# Patient Record
Sex: MALE | Race: WHITE | NOT HISPANIC OR LATINO | Employment: OTHER | URBAN - METROPOLITAN AREA
[De-identification: names, ages, dates, MRNs, and addresses within clinical notes are randomized per-mention and may not be internally consistent; named-entity substitution may affect disease eponyms.]

---

## 2018-06-11 LAB
ALBUMIN SERPL BCP-MCNC: 4.7 G/DL (ref 3.5–5.7)
ALP SERPL-CCNC: 55 IU/L (ref 40–150)
ALT SERPL W P-5'-P-CCNC: 14 IU/L (ref 0–50)
ANION GAP SERPL CALCULATED.3IONS-SCNC: 12.1 MM/L
AST SERPL W P-5'-P-CCNC: 11 U/L (ref 8–27)
BASOPHILS # BLD AUTO: 0.1 X3/UL (ref 0–0.3)
BASOPHILS # BLD AUTO: 0.9 % (ref 0–2)
BILIRUB SERPL-MCNC: 0.4 MG/DL (ref 0.3–1)
BILIRUBIN DIRECT (HISTORICAL): 0 MG/DL (ref 0–0.2)
BUN SERPL-MCNC: 35 MG/DL (ref 7–25)
CALCIUM SERPL-MCNC: 9.5 MG/DL (ref 8.6–10.5)
CHLORIDE SERPL-SCNC: 103 MM/L (ref 98–107)
CHOLEST SERPL-MCNC: 187 MG/DL (ref 0–200)
CO2 SERPL-SCNC: 25 MM/L (ref 21–31)
CREAT SERPL-MCNC: 2.21 MG/DL (ref 0.7–1.3)
DEPRECATED RDW RBC AUTO: 13.4 % (ref 11.5–14.5)
EGFR (HISTORICAL): 31 GFR
EGFR AFRICAN AMERICAN (HISTORICAL): 38 GFR
EOSINOPHIL # BLD AUTO: 0.2 X3/UL (ref 0–0.5)
EOSINOPHIL NFR BLD AUTO: 2.9 % (ref 0–5)
EST. AVERAGE GLUCOSE BLD GHB EST-MCNC: 114 MG/DL
GLUCOSE (HISTORICAL): 116 MG/DL (ref 65–99)
HBA1C MFR BLD HPLC: 5.6 % (ref 4–6.2)
HCT VFR BLD AUTO: 41.4 % (ref 42–52)
HDLC SERPL-MCNC: 29 MG/DL (ref 40–60)
HGB BLD-MCNC: 14.4 G/DL (ref 14–18)
LDLC SERPL CALC-MCNC: 131.6 MG/DL (ref 75–193)
LYMPHOCYTES # BLD AUTO: 1.8 X3/UL (ref 1.2–4.2)
LYMPHOCYTES NFR BLD AUTO: 27.4 % (ref 20.5–51.1)
MCH RBC QN AUTO: 30.1 PG (ref 26–34)
MCHC RBC AUTO-ENTMCNC: 34.9 G/DL (ref 31–36)
MCV RBC AUTO: 86.3 FL (ref 81–99)
MONOCYTES # BLD AUTO: 0.8 X3/UL (ref 0–1)
MONOCYTES NFR BLD AUTO: 11.7 % (ref 1.7–12)
NEUTROPHILS # BLD AUTO: 3.8 X3/UL (ref 1.4–6.5)
NEUTS SEG NFR BLD AUTO: 57.1 % (ref 42.2–75.2)
OSMOLALITY, SERUM (HISTORICAL): 281 MOSM (ref 262–291)
PLATELET # BLD AUTO: 227 X3/UL (ref 130–400)
PMV BLD AUTO: 7.2 FL (ref 8.6–11.7)
POTASSIUM SERPL-SCNC: 4.1 MM/L (ref 3.5–5.5)
RBC # BLD AUTO: 4.79 X6/UL (ref 4.3–5.9)
SODIUM SERPL-SCNC: 136 MM/L (ref 134–143)
TOTAL PROTEIN (HISTORICAL): 7.4 G/DL (ref 6.4–8.9)
TRIGL SERPL-MCNC: 132 MG/DL (ref 44–166)
TSH SERPL DL<=0.05 MIU/L-ACNC: 1.2 UIU/M (ref 0.45–5.33)
VLDL CHOLESTEROL (HISTORICAL): 26 MG/DL (ref 5–51)
WBC # BLD AUTO: 6.7 X3/UL (ref 4.8–10.8)

## 2022-01-31 ENCOUNTER — OFFICE VISIT (OUTPATIENT)
Dept: FAMILY MEDICINE CLINIC | Facility: HOME HEALTHCARE | Age: 57
End: 2022-01-31
Payer: COMMERCIAL

## 2022-01-31 VITALS
HEART RATE: 95 BPM | HEIGHT: 72 IN | TEMPERATURE: 99.5 F | SYSTOLIC BLOOD PRESSURE: 140 MMHG | RESPIRATION RATE: 20 BRPM | WEIGHT: 214.8 LBS | BODY MASS INDEX: 29.09 KG/M2 | OXYGEN SATURATION: 98 % | DIASTOLIC BLOOD PRESSURE: 90 MMHG

## 2022-01-31 DIAGNOSIS — M21.612 BILATERAL BUNIONS: ICD-10-CM

## 2022-01-31 DIAGNOSIS — M13.0 POLYARTHRITIS OF MULTIPLE SITES: ICD-10-CM

## 2022-01-31 DIAGNOSIS — Z00.00 ENCOUNTER FOR MEDICAL EXAMINATION TO ESTABLISH CARE: Primary | ICD-10-CM

## 2022-01-31 DIAGNOSIS — F10.20 ALCOHOLISM (HCC): ICD-10-CM

## 2022-01-31 DIAGNOSIS — Z11.59 ENCOUNTER FOR HEPATITIS C SCREENING TEST FOR LOW RISK PATIENT: ICD-10-CM

## 2022-01-31 DIAGNOSIS — I10 PRIMARY HYPERTENSION: ICD-10-CM

## 2022-01-31 DIAGNOSIS — M19.049 HAND ARTHRITIS: ICD-10-CM

## 2022-01-31 DIAGNOSIS — Z13.29 SCREENING FOR ENDOCRINE, NUTRITIONAL, METABOLIC AND IMMUNITY DISORDER: ICD-10-CM

## 2022-01-31 DIAGNOSIS — F25.9 SCHIZOAFFECTIVE DISORDER, UNSPECIFIED TYPE (HCC): ICD-10-CM

## 2022-01-31 DIAGNOSIS — Z12.11 COLON CANCER SCREENING: ICD-10-CM

## 2022-01-31 DIAGNOSIS — Z53.20 HIV SCREENING DECLINED: ICD-10-CM

## 2022-01-31 DIAGNOSIS — M21.611 BILATERAL BUNIONS: ICD-10-CM

## 2022-01-31 DIAGNOSIS — M15.1 HEBERDEN'S NODES OF BOTH HANDS: ICD-10-CM

## 2022-01-31 DIAGNOSIS — M10.9 ACUTE GOUT OF LEFT FOOT, UNSPECIFIED CAUSE: ICD-10-CM

## 2022-01-31 DIAGNOSIS — M25.50 POLYARTHRALGIA: ICD-10-CM

## 2022-01-31 DIAGNOSIS — Z13.228 SCREENING FOR ENDOCRINE, NUTRITIONAL, METABOLIC AND IMMUNITY DISORDER: ICD-10-CM

## 2022-01-31 DIAGNOSIS — Z13.21 SCREENING FOR ENDOCRINE, NUTRITIONAL, METABOLIC AND IMMUNITY DISORDER: ICD-10-CM

## 2022-01-31 DIAGNOSIS — R79.89 ELEVATED SERUM CREATININE: ICD-10-CM

## 2022-01-31 DIAGNOSIS — Z13.0 SCREENING FOR ENDOCRINE, NUTRITIONAL, METABOLIC AND IMMUNITY DISORDER: ICD-10-CM

## 2022-01-31 PROCEDURE — T1015 CLINIC SERVICE: HCPCS | Performed by: FAMILY MEDICINE

## 2022-01-31 RX ORDER — TRAZODONE HYDROCHLORIDE 100 MG/1
200 TABLET ORAL
COMMUNITY
Start: 2021-11-11

## 2022-01-31 RX ORDER — CLONAZEPAM 0.5 MG/1
TABLET ORAL
COMMUNITY
Start: 2022-01-11

## 2022-01-31 RX ORDER — TRAZODONE HYDROCHLORIDE 150 MG/1
TABLET ORAL
COMMUNITY
Start: 2021-11-18

## 2022-01-31 RX ORDER — QUETIAPINE FUMARATE 300 MG/1
600 TABLET, FILM COATED ORAL
COMMUNITY
Start: 2022-01-12

## 2022-01-31 RX ORDER — PREDNISONE 10 MG/1
TABLET ORAL
Qty: 30 TABLET | Refills: 0 | Status: SHIPPED | OUTPATIENT
Start: 2022-01-31 | End: 2022-02-12

## 2022-01-31 NOTE — PROGRESS NOTES
[facility]       NAME: David Reddy is a 60 y o  male  : 1965    MRN: [id number]  DATE: 2022  TIME: 12:46 PM    Assessment and Plan   Diagnoses and all orders for this visit:    Encounter for medical examination to establish care  -     Ambulatory Referral to Rheumatology; Future    Colon cancer screening  -     Ambulatory Referral to Gastroenterology; Future    Encounter for hepatitis C screening test for low risk patient  -     Hepatitis C antibody; Future    HIV screening declined  -     HIV 1/2 ANTIGEN/ANTIBODY (4TH GENERATION) W REFLEX [facility]; Future    Screening for endocrine, nutritional, metabolic and immunity disorder  -     Comprehensive metabolic panel  -     Lipid Panel with Direct LDL reflex; Future  -     CBC and differential  -     TSH, 3rd generation with Free T4 reflex; Future    Elevated serum creatinine  -     Comprehensive metabolic panel    Polyarthralgia  -     Ambulatory Referral to Rheumatology; Future    Polyarthritis of multiple sites  -     Ambulatory Referral to Rheumatology; Future    Hand arthritis  -     Ambulatory Referral to Rheumatology; Future    Acute gout of left foot, unspecified cause  -     predniSONE 10 mg tablet; Take 4 tablets (40 mg total) by mouth daily for 3 days, THEN 3 tablets (30 mg total) daily for 3 days, THEN 2 tablets (20 mg total) daily for 3 days, THEN 1 tablet (10 mg total) daily for 3 days  Schizoaffective disorder, unspecified type ([facility] [address] )    Primary hypertension    Bilateral bunions  -     Ambulatory Referral to Podiatry; Future    Alcoholism ([facility] )    Heberden's nodes of both hands    Other orders  -     clonazePAM (KlonoPIN) 0 5 mg tablet; take 1 to 2 tablets by mouth daily if needed for anxiety  -     QUEtiapine (SEROquel) 300 mg tablet; Take 600 mg by mouth daily at bedtime 2 tabs at HS   -     traZODone (DESYREL) 100 mg tablet;  Take 200 mg by mouth daily at bedtime (Patient not taking: Reported on 2022 )  - traZODone (DESYREL) 150 mg tablet; take 1/2 to 1 tablet by mouth at bedtime if needed for insomnia      Patient will get his fasting lab work completed  Patient will follow up in 3-4 weeks or sooner if needed so we can review lab work and readdress his elevated blood pressure  Patient did have creatinine of 2 2 in 2018 with no repeat lab work  Will re-evaluate patient's blood pressure next visit and likely start medication once lab work completed  Rheumatology referral  Podiatry referral  We will continue to follow Psychiatry  Addressed his alcoholism and counseled patient on attempting to stop consume alcohol  Smoking cessation discussed and encouraged  Patient will continue to get his psychiatric medications from Psychiatry  Instructed to call me with any questions or concerns    Chief Complaint     Chief Complaint   Patient presents with    New Patient Visit    Gout Pain     feet    Arthritis         History of Present Illness       HPI   33-year-old male new patient here today for sick visit  Patient states has not seen a physician in many years  Patient states with having issues with gout and arthritis of his hands for many years  States does take occasional ibuprofen  Positive deformities of both hands/fingers  Positive Heberden's nodes  Patient with gout left foot with mild erythema and tenderness just proximal to the left great toe  Positive bilateral bunions  Patient states falls with Psychiatry on a monthly basis at [facility] pass  Reports diagnosis of depression and schizoaffective disorder  Denies any suicidal ideation or thoughts of hurting others  Review of Systems   Review of Systems    As per HPI  Denies fevers, chills, headaches, dizziness, chest pain, shortness breath, cough, abdominal pain, nausea, vomiting, diarrhea, constipation, lower extremity edema, calf pain, weight changes      All other systems negative    Current Medications       Current Outpatient Medications:   clonazePAM (KlonoPIN) 0 5 mg tablet, take 1 to 2 tablets by mouth daily if needed for anxiety, Disp: , Rfl:     QUEtiapine (SEROquel) 300 mg tablet, Take 600 mg by mouth daily at bedtime 2 tabs at HS , Disp: , Rfl:     traZODone (DESYREL) 150 mg tablet, take 1/2 to 1 tablet by mouth at bedtime if needed for insomnia, Disp: , Rfl:     predniSONE 10 mg tablet, Take 4 tablets (40 mg total) by mouth daily for 3 days, THEN 3 tablets (30 mg total) daily for 3 days, THEN 2 tablets (20 mg total) daily for 3 days, THEN 1 tablet (10 mg total) daily for 3 days  , Disp: 30 tablet, Rfl: 0    traZODone (DESYREL) 100 mg tablet, Take 200 mg by mouth daily at bedtime (Patient not taking: Reported on 1/31/2022 ), Disp: , Rfl:     Current Allergies     Allergies as of 01/31/2022    (No Known Allergies)            The following portions of the patient's history were reviewed and updated as appropriate: allergies, current medications, past family history, past medical history, past social history, past surgical history and problem list      Past Medical History:   Diagnosis Date    Depression        History reviewed  No pertinent surgical history  History reviewed  No pertinent family history  Medications have been verified  Objective   /90   Pulse 95   Temp 99 5 °F (37 5 °C)   Resp 20   Ht 6' (1 829 m)   Wt 97 4 kg (214 lb 12 8 oz)   SpO2 98%   BMI 29 13 kg/m²        Physical Exam     Physical Exam  Vitals reviewed  Constitutional:       General: He is not in acute distress  Appearance: He is not ill-appearing, toxic-appearing or diaphoretic  HENT:      Head: Normocephalic  Nose: Nose normal       Mouth/Throat:      Mouth: Mucous membranes are moist       Pharynx: Oropharynx is clear  Eyes:      General: No scleral icterus  Conjunctiva/sclera: Conjunctivae normal       Pupils: Pupils are equal, round, and reactive to light     Cardiovascular:      Rate and Rhythm: Normal rate and regular rhythm  Heart sounds: Normal heart sounds  Pulmonary:      Effort: Pulmonary effort is normal       Breath sounds: Normal breath sounds  Abdominal:      General: Bowel sounds are normal       Palpations: Abdomen is soft  Tenderness: There is no abdominal tenderness  Musculoskeletal:      Cervical back: Neck supple  Right lower leg: No edema  Left lower leg: No edema  Comments: Positive Heberden's nodes both hands with finger deformities  Left foot gout described in HPI  Lymphadenopathy:      Cervical: No cervical adenopathy  Skin:     General: Skin is warm and dry  Capillary Refill: Capillary refill takes less than 2 seconds  Neurological:      Mental Status: He is alert and oriented to person, place, and time     Psychiatric:         Mood and Affect: Mood normal

## 2022-02-22 DIAGNOSIS — Z23 NEED FOR SHINGLES VACCINE: Primary | ICD-10-CM

## 2022-02-22 RX ORDER — ZOSTER VACCINE RECOMBINANT, ADJUVANTED 50 MCG/0.5
0.5 KIT INTRAMUSCULAR ONCE
Qty: 1 EACH | Refills: 1 | Status: SHIPPED | OUTPATIENT
Start: 2022-02-22 | End: 2022-02-22

## 2022-02-25 ENCOUNTER — TELEPHONE (OUTPATIENT)
Dept: OTHER | Facility: OTHER | Age: 57
End: 2022-02-25

## 2022-07-18 ENCOUNTER — OFFICE VISIT (OUTPATIENT)
Dept: RHEUMATOLOGY | Facility: CLINIC | Age: 57
End: 2022-07-18
Payer: COMMERCIAL

## 2022-07-18 ENCOUNTER — APPOINTMENT (OUTPATIENT)
Dept: LAB | Facility: MEDICAL CENTER | Age: 57
End: 2022-07-18
Payer: COMMERCIAL

## 2022-07-18 VITALS
DIASTOLIC BLOOD PRESSURE: 95 MMHG | SYSTOLIC BLOOD PRESSURE: 135 MMHG | BODY MASS INDEX: 32.51 KG/M2 | HEIGHT: 72 IN | WEIGHT: 240 LBS

## 2022-07-18 DIAGNOSIS — M13.0 POLYARTHRITIS OF MULTIPLE SITES: ICD-10-CM

## 2022-07-18 DIAGNOSIS — Z79.899 HIGH RISK MEDICATION USE: ICD-10-CM

## 2022-07-18 DIAGNOSIS — M19.049 HAND ARTHRITIS: ICD-10-CM

## 2022-07-18 DIAGNOSIS — M1A.9XX1 TOPHACEOUS GOUT: Primary | ICD-10-CM

## 2022-07-18 DIAGNOSIS — Z00.00 ENCOUNTER FOR MEDICAL EXAMINATION TO ESTABLISH CARE: ICD-10-CM

## 2022-07-18 DIAGNOSIS — M25.50 POLYARTHRALGIA: ICD-10-CM

## 2022-07-18 LAB
ALBUMIN SERPL BCP-MCNC: 4.4 G/DL (ref 3.5–5)
ALP SERPL-CCNC: 81 U/L (ref 46–116)
ALT SERPL W P-5'-P-CCNC: 20 U/L (ref 12–78)
ANION GAP SERPL CALCULATED.3IONS-SCNC: 9 MMOL/L (ref 4–13)
AST SERPL W P-5'-P-CCNC: 16 U/L (ref 5–45)
BASOPHILS # BLD AUTO: 0.06 THOUSANDS/ΜL (ref 0–0.1)
BASOPHILS NFR BLD AUTO: 1 % (ref 0–1)
BILIRUB SERPL-MCNC: 0.58 MG/DL (ref 0.2–1)
BUN SERPL-MCNC: 21 MG/DL (ref 5–25)
CALCIUM SERPL-MCNC: 9.7 MG/DL (ref 8.3–10.1)
CHLORIDE SERPL-SCNC: 106 MMOL/L (ref 96–108)
CO2 SERPL-SCNC: 24 MMOL/L (ref 21–32)
CREAT SERPL-MCNC: 1.92 MG/DL (ref 0.6–1.3)
CRP SERPL QL: 18.6 MG/L
EOSINOPHIL # BLD AUTO: 0.09 THOUSAND/ΜL (ref 0–0.61)
EOSINOPHIL NFR BLD AUTO: 1 % (ref 0–6)
ERYTHROCYTE [DISTWIDTH] IN BLOOD BY AUTOMATED COUNT: 16.2 % (ref 11.6–15.1)
ERYTHROCYTE [SEDIMENTATION RATE] IN BLOOD: 37 MM/HOUR (ref 0–19)
GFR SERPL CREATININE-BSD FRML MDRD: 37 ML/MIN/1.73SQ M
GLUCOSE SERPL-MCNC: 86 MG/DL (ref 65–140)
HCT VFR BLD AUTO: 44.9 % (ref 36.5–49.3)
HGB BLD-MCNC: 13.6 G/DL (ref 12–17)
IMM GRANULOCYTES # BLD AUTO: 0.05 THOUSAND/UL (ref 0–0.2)
IMM GRANULOCYTES NFR BLD AUTO: 1 % (ref 0–2)
LYMPHOCYTES # BLD AUTO: 1.45 THOUSANDS/ΜL (ref 0.6–4.47)
LYMPHOCYTES NFR BLD AUTO: 15 % (ref 14–44)
MCH RBC QN AUTO: 24.4 PG (ref 26.8–34.3)
MCHC RBC AUTO-ENTMCNC: 30.3 G/DL (ref 31.4–37.4)
MCV RBC AUTO: 81 FL (ref 82–98)
MONOCYTES # BLD AUTO: 0.75 THOUSAND/ΜL (ref 0.17–1.22)
MONOCYTES NFR BLD AUTO: 8 % (ref 4–12)
NEUTROPHILS # BLD AUTO: 7.41 THOUSANDS/ΜL (ref 1.85–7.62)
NEUTS SEG NFR BLD AUTO: 74 % (ref 43–75)
NRBC BLD AUTO-RTO: 0 /100 WBCS
PLATELET # BLD AUTO: 336 THOUSANDS/UL (ref 149–390)
PMV BLD AUTO: 8.9 FL (ref 8.9–12.7)
POTASSIUM SERPL-SCNC: 4 MMOL/L (ref 3.5–5.3)
PROT SERPL-MCNC: 8.3 G/DL (ref 6.4–8.4)
RBC # BLD AUTO: 5.57 MILLION/UL (ref 3.88–5.62)
SODIUM SERPL-SCNC: 139 MMOL/L (ref 135–147)
URATE SERPL-MCNC: 10.9 MG/DL (ref 3.5–8.5)
WBC # BLD AUTO: 9.81 THOUSAND/UL (ref 4.31–10.16)

## 2022-07-18 PROCEDURE — 86803 HEPATITIS C AB TEST: CPT | Performed by: INTERNAL MEDICINE

## 2022-07-18 PROCEDURE — 87340 HEPATITIS B SURFACE AG IA: CPT | Performed by: INTERNAL MEDICINE

## 2022-07-18 PROCEDURE — 36415 COLL VENOUS BLD VENIPUNCTURE: CPT | Performed by: INTERNAL MEDICINE

## 2022-07-18 PROCEDURE — 80053 COMPREHEN METABOLIC PANEL: CPT | Performed by: INTERNAL MEDICINE

## 2022-07-18 PROCEDURE — 84550 ASSAY OF BLOOD/URIC ACID: CPT | Performed by: INTERNAL MEDICINE

## 2022-07-18 PROCEDURE — 85652 RBC SED RATE AUTOMATED: CPT | Performed by: INTERNAL MEDICINE

## 2022-07-18 PROCEDURE — 86140 C-REACTIVE PROTEIN: CPT | Performed by: INTERNAL MEDICINE

## 2022-07-18 PROCEDURE — 86705 HEP B CORE ANTIBODY IGM: CPT | Performed by: INTERNAL MEDICINE

## 2022-07-18 PROCEDURE — 85025 COMPLETE CBC W/AUTO DIFF WBC: CPT | Performed by: INTERNAL MEDICINE

## 2022-07-18 PROCEDURE — 86480 TB TEST CELL IMMUN MEASURE: CPT | Performed by: INTERNAL MEDICINE

## 2022-07-18 PROCEDURE — 86704 HEP B CORE ANTIBODY TOTAL: CPT | Performed by: INTERNAL MEDICINE

## 2022-07-18 PROCEDURE — 99244 OFF/OP CNSLTJ NEW/EST MOD 40: CPT | Performed by: INTERNAL MEDICINE

## 2022-07-18 RX ORDER — FOLIC ACID 1 MG/1
1 TABLET ORAL DAILY
Qty: 30 TABLET | Refills: 5 | Status: SHIPPED | OUTPATIENT
Start: 2022-07-18

## 2022-07-18 NOTE — PROGRESS NOTES
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  Would benefit from Krystexxa infusions, which patient wants to obtain at the the [facility]  Will obtain prior auth  Patient has CKD, so will hold off initiating colchicine; urate lowering therapy such as allopurinol would be contraindicated to be used concurrently with Krystexxa (pegloticase)  Do labs  Start methotrexate 4 tabs once a week  Start folic acid daily  Will work on getting patient started Krystexxa infusions every 2 weeks at the [facility]  Follow gout diet  Can try diclofenac gel as needed for joint pain    Return to clinic in 4 months    Plan:  Diagnoses and all orders for this visit:    Tophaceous gout  -     CBC and differential  -     Sedimentation rate, automated  -     Comprehensive metabolic panel  -     C-reactive protein  -     Uric acid  -     Diclofenac Sodium (VOLTAREN) 1 %; Apply 2 g topically 4 (four) times a day    Polyarthralgia  -     Ambulatory Referral to Rheumatology    Polyarthritis of multiple sites  -     Ambulatory Referral to Rheumatology  -     Diclofenac Sodium (VOLTAREN) 1 %; Apply 2 g topically 4 (four) times a day    Hand arthritis  -     Ambulatory Referral to Rheumatology    Encounter for medical examination to establish care  -     Ambulatory Referral to Rheumatology    High risk medication use  -     Chronic Hepatitis Panel  -     Quantiferon TB Gold Plus  -     methotrexate 2 5 mg tablet; 4 tablet po weekly (take all 4 tablets on the same day every week)  -     folic acid (FOLVITE) 1 mg tablet;  Take 1 tablet (1 mg total) by mouth daily    Other orders  -     sodium chloride 0 9 % infusion  -     acetaminophen (TYLENOL) tablet 650 mg  -     diphenhydrAMINE (BENADRYL) tablet 25 mg  -     methylPREDNISolone sodium succinate (Solu-MEDROL) injection 125 mg  -     pegloticase (KRYSTEXXA) 8 mg in sodium chloride 0 9 % 250 mL IVPB  High risk medication use - Patient counseled on the risks and benefits of Krystexxa (pegloticase) infusions  This medication can significantly reduce tophi burden, however, has the potential for causing hypersensitivity infusion reactions and needs to be closely monitored during infusion  Also, antibody production to the medication has been shown to develop in patients who are not concurrently on DMARD therapy prophylaxis to prevent antibody production; DMARD therapy also reduces chance of hypersensitivity reaction to the medication  Patient will be started on methotrexate, which he'll be on for two weeks prior to starting infusions and will continue taking throughout her therapy on Krystexxa  His serum uric acid level will be closely monitored  Follow-up plan: RTC in 4 months        HPI  Homa Robins is a 60 y o   male who presents as a Rheumatology consult referred by Hi Simmons PA-C for evaluation of tophaceous gout  Has schizoaffective disorder  Had 1st gout attack and both feet in 2006  Started drinking heavily in May 2021, noticed bumps for there first time them; started noticing hand pain in September 2021  Stopped drinking in 12/2021  Smokes 1/2 PPD of cigarettes  Review of Systems  Review of Systems   Constitutional: Negative for fatigue, fever and unexpected weight change  HENT: Negative for mouth sores  Respiratory: Negative for cough and shortness of breath  Cardiovascular: Negative for chest pain and leg swelling  Gastrointestinal: Negative for abdominal pain, constipation and diarrhea  Musculoskeletal: Positive for arthralgias and joint swelling  Negative for back pain and myalgias  Skin: Negative for color change and rash  Neurological: Negative for weakness  Hematological: Negative for adenopathy  Psychiatric/Behavioral: Negative for sleep disturbance  Reviewed and agree      Allergies  No Known Allergies    Home Medications    Current Outpatient Medications:     clonazePAM (KlonoPIN) 0 5 mg tablet, take 1 to 2 tablets by mouth daily if needed for anxiety, Disp: , Rfl:     Diclofenac Sodium (VOLTAREN) 1 %, Apply 2 g topically 4 (four) times a day, Disp: 100 g, Rfl: 6    folic acid (FOLVITE) 1 mg tablet, Take 1 tablet (1 mg total) by mouth daily, Disp: 30 tablet, Rfl: 5    methotrexate 2 5 mg tablet, 4 tablet po weekly (take all 4 tablets on the same day every week), Disp: 20 tablet, Rfl: 5    QUEtiapine (SEROquel) 300 mg tablet, Take 600 mg by mouth daily at bedtime 2 tabs at HS , Disp: , Rfl:     traZODone (DESYREL) 150 mg tablet, take 1/2 to 1 tablet by mouth at bedtime if needed for insomnia, Disp: , Rfl:     traZODone (DESYREL) 100 mg tablet, Take 200 mg by mouth daily at bedtime (Patient not taking: No sig reported), Disp: , Rfl:     Past Medical History  Past Medical History:   Diagnosis Date    Depression        Past Surgical History   History reviewed  No pertinent surgical history  Family History  History reviewed  No pertinent family history  Brother - gout  Sister - osteoarthritis      Social History  Occupation: was a , then used to [employer]; stopped working in [employer] History     Substance and Sexual Activity   Alcohol Use Not Currently    Alcohol/week: 12 0 standard drinks    Types: 12 Cans of beer per week    Comment: Last drink 12/2021     Social History     Substance and Sexual Activity   Drug Use Not Currently     Social History     Tobacco Use   Smoking Status Current Every Day Smoker    Packs/day: 0 50    Types: Cigarettes   Smokeless Tobacco Never Used       Objective:  Vitals:    07/18/22 1346   BP: 135/95   Weight: 109 kg (240 lb)   Height: 6' (1 829 m)       Physical Exam  Constitutional:       General: He is not in acute distress  HENT:      Head: Normocephalic and atraumatic     Eyes:      Conjunctiva/sclera: Conjunctivae normal    Cardiovascular:      Rate and Rhythm: Normal rate and regular rhythm  Heart sounds: S1 normal and S2 normal      No friction rub  Pulmonary:      Effort: Pulmonary effort is normal  No respiratory distress  Breath sounds: Normal breath sounds  No wheezing, rhonchi or rales  Musculoskeletal:         General: Swelling, tenderness and deformity present  Cervical back: Neck supple  Comments: Significant hand tophi (picture uploaded in Media); L big toe prominence   Skin:     General: Skin is warm and dry  Coloration: Skin is not pale  Findings: No rash  Neurological:      Mental Status: He is alert  Mental status is at baseline  Psychiatric:         Mood and Affect: Mood normal          Behavior: Behavior normal        Reviewed labs      Labs:   Office Visit on 07/18/2022   Component Date Value Ref Range Status    WBC 07/18/2022 9 81  4 31 - 10 16 Thousand/uL Final    RBC 07/18/2022 5 57  3 88 - 5 62 Million/uL Final    Hemoglobin 07/18/2022 13 6  12 0 - 17 0 g/dL Final    Hematocrit 07/18/2022 44 9  36 5 - 49 3 % Final    MCV 07/18/2022 81 (A) 82 - 98 fL Final    MCH 07/18/2022 24 4 (A) 26 8 - 34 3 pg Final    MCHC 07/18/2022 30 3 (A) 31 4 - 37 4 g/dL Final    RDW 07/18/2022 16 2 (A) 11 6 - 15 1 % Final    MPV 07/18/2022 8 9  8 9 - 12 7 fL Final    Platelets 55/49/0616 336  149 - 390 Thousands/uL Final    nRBC 07/18/2022 0  /100 WBCs Final    Neutrophils Relative 07/18/2022 74  43 - 75 % Final    Immat GRANS % 07/18/2022 1  0 - 2 % Final    Lymphocytes Relative 07/18/2022 15  14 - 44 % Final    Monocytes Relative 07/18/2022 8  4 - 12 % Final    Eosinophils Relative 07/18/2022 1  0 - 6 % Final    Basophils Relative 07/18/2022 1  0 - 1 % Final    Neutrophils Absolute 07/18/2022 7 41  1 85 - 7 62 Thousands/µL Final    Immature Grans Absolute 07/18/2022 0 05  0 00 - 0 20 Thousand/uL Final    Lymphocytes Absolute 07/18/2022 1 45  0 60 - 4 47 Thousands/µL Final    Monocytes Absolute 07/18/2022 0  75  0 17 - 1 22 Thousand/µL Final    Eosinophils Absolute 07/18/2022 0 09  0 00 - 0 61 Thousand/µL Final    Basophils Absolute 07/18/2022 0 06  0 00 - 0 10 Thousands/µL Final    Sed Rate 07/18/2022 37 (A) 0 - 19 mm/hour Final    Sodium 07/18/2022 139  135 - 147 mmol/L Final    Potassium 07/18/2022 4 0  3 5 - 5 3 mmol/L Final    Chloride 07/18/2022 106  96 - 108 mmol/L Final    CO2 07/18/2022 24  21 - 32 mmol/L Final    ANION GAP 07/18/2022 9  4 - 13 mmol/L Final    BUN 07/18/2022 21  5 - 25 mg/dL Final    Creatinine 07/18/2022 1 92 (A) 0 60 - 1 30 mg/dL Final    Standardized to IDMS reference method    Glucose 07/18/2022 86  65 - 140 mg/dL Final    If the patient is fasting, the ADA then defines impaired fasting glucose as > 100 mg/dL and diabetes as > or equal to 123 mg/dL  Specimen collection should occur prior to Sulfasalazine administration due to the potential for falsely depressed results  Specimen collection should occur prior to Sulfapyridine administration due to the potential for falsely elevated results   Calcium 07/18/2022 9 7  8 3 - 10 1 mg/dL Final    AST 07/18/2022 16  5 - 45 U/L Final    Specimen collection should occur prior to Sulfasalazine administration due to the potential for falsely depressed results   ALT 07/18/2022 20  12 - 78 U/L Final    Specimen collection should occur prior to Sulfasalazine and/or Sulfapyridine administration due to the potential for falsely depressed results   Alkaline Phosphatase 07/18/2022 81  46 - 116 U/L Final    Total Protein 07/18/2022 8 3  6 4 - 8 4 g/dL Final    Albumin 07/18/2022 4 4  3 5 - 5 0 g/dL Final    Total Bilirubin 07/18/2022 0 58  0 20 - 1 00 mg/dL Final    Use of this assay is not recommended for patients undergoing treatment with eltrombopag due to the potential for falsely elevated results      eGFR 07/18/2022 37  ml/min/1 73sq m Final    CRP 07/18/2022 18 6 (A) <3 0 mg/L Final    Uric Acid 07/18/2022 10 9 (A) 3 5 - 8 5 mg/dL Final    Specimen collection should occur prior to Metamizole administration due to the potential for falsely depressed results   Hepatitis B Surface Ag 07/18/2022 Non-reactive  Non-reactive, NonReactive - Confirmed Final    Hepatitis C Ab 07/18/2022 Non-reactive  Non-reactive Final    Hep B C IgM 07/18/2022 Non-reactive  Non-reactive Final    Hep B Core Total Ab 07/18/2022 Non-reactive  Non-reactive Final    QFT Nil 07/18/2022 0 02  0 - 8 0 IU/ml Final    QFT TB1-NIL 07/18/2022 0 00  IU/ml Final    QFT TB2-NIL 07/18/2022 0 00  IU/ml Final    QFT Mitogen-NIL 07/18/2022 >10 00  IU/ml Final    QFT Final Interpretation 07/18/2022 Negative  Negative Final    No Interferon-gamma response to M  tuberculosis antigens detected  Infection with M  tuberculosis is unlikely  A single negative result does not exclude infection with M  tuberculosis  In patients at high risk for M  tuberculosis infection, a second test should be considered in accordance with the 2017 ATS/IDSA/CDC Clinical Practice Guidelines for Diagnosis of Tuberculosis in Adults and Children  False negative results can be a result of incorrect blood sample collection or handling of the specimen affecting lymphocyte function

## 2022-07-19 LAB
HBV CORE AB SER QL: NORMAL
HBV CORE IGM SER QL: NORMAL
HBV SURFACE AG SER QL: NORMAL
HCV AB SER QL: NORMAL

## 2022-07-20 LAB
GAMMA INTERFERON BACKGROUND BLD IA-ACNC: 0.02 IU/ML
M TB IFN-G BLD-IMP: NEGATIVE
M TB IFN-G CD4+ BCKGRND COR BLD-ACNC: 0 IU/ML
M TB IFN-G CD4+ BCKGRND COR BLD-ACNC: 0 IU/ML
MITOGEN IGNF BCKGRD COR BLD-ACNC: >10 IU/ML

## 2022-08-15 ENCOUNTER — TELEPHONE (OUTPATIENT)
Dept: RHEUMATOLOGY | Facility: CLINIC | Age: 57
End: 2022-08-15

## 2022-08-15 PROBLEM — M1A.9XX1 TOPHACEOUS GOUT: Status: ACTIVE | Noted: 2022-08-15

## 2022-08-15 RX ORDER — ACETAMINOPHEN 325 MG/1
650 TABLET ORAL ONCE
OUTPATIENT
Start: 2022-08-15

## 2022-08-15 RX ORDER — SODIUM CHLORIDE 9 MG/ML
20 INJECTION, SOLUTION INTRAVENOUS ONCE
OUTPATIENT
Start: 2022-08-15

## 2022-08-15 RX ORDER — DIPHENHYDRAMINE HCL 25 MG
25 TABLET ORAL ONCE
OUTPATIENT
Start: 2022-08-15

## 2022-08-15 RX ORDER — METHYLPREDNISOLONE SODIUM SUCCINATE 40 MG/ML
125 INJECTION, POWDER, LYOPHILIZED, FOR SOLUTION INTRAMUSCULAR; INTRAVENOUS ONCE
OUTPATIENT
Start: 2022-08-15

## 2022-08-15 NOTE — TELEPHONE ENCOUNTER
Meliza Milton,  Please work on prior [employer] for patient to start receiving Krystexxa (pegloticase) infusions 8mg IV every 2 weeks at the [facility] center for severe tophaceous gout (for buy and bill through his medical benefits)  He's already been on more than 2 weeks of methotrexate and is not on any anti-hyperuricemic drugs  Has recent TB quantiferon and viral hepatitis panel  He already signed a patient assistance form if needed that needs to be faxed in  Please keep me updated regarding this prior auth  Gala Ricardo, please help if needed

## 2022-08-16 NOTE — TELEPHONE ENCOUNTER
Lul Messer from [employer] called and did not want to leave a message  States that she will send an email to provider

## 2022-09-09 NOTE — TELEPHONE ENCOUNTER
Patient called in regard to the Krystexxa infusions stating that he still hasn't heard from anyone at this point  He states that the hospital was suppose to call him but he hasn't heard back from anyone  Also states that he hasn't heard back from his insurance company either  Patient asking for advisement as to how he should proceed    Patient asked to speak from clinical team

## 2022-09-09 NOTE — TELEPHONE ENCOUNTER
Spoke with Isaias Patel from [employer], refaxed a corrected patient enrollment form to       Prior auth submitted on [employer] key [id number]

## 2022-09-09 NOTE — TELEPHONE ENCOUNTER
Please follow-up on what's going on regarding this patient's [employer] Client?? In addition to sending the form, you were supposed to do a prior auth

## 2022-09-10 NOTE — TELEPHONE ENCOUNTER
[facility] called and denied patient to have medicatio: Gill Longoria  We are having phone problems and then I lost the call

## 2022-09-13 NOTE — TELEPHONE ENCOUNTER
Yo Thorpe from [facility] is calling stating that she would like to speak to Spencer Cooper  Please call Yo Thorpe back at    #[phone]

## 2022-09-19 NOTE — TELEPHONE ENCOUNTER
Patient received letter that his medication was denied       He would like a call  Back to discuss options     #: [phone]

## 2022-09-19 NOTE — TELEPHONE ENCOUNTER
Will work on [address]  [facility] with information update that allopurinol or any other oral gout medication would be contraindicated in patient's case due to CKD

## 2022-09-30 NOTE — TELEPHONE ENCOUNTER
Spoke with [facility] on 9/30, their clinical team is reviewing information for patient and we should receive a fax with determination today, latest on Monday  Determination will be faxed to [phone]

## 2022-11-16 ENCOUNTER — OFFICE VISIT (OUTPATIENT)
Dept: FAMILY MEDICINE CLINIC | Facility: HOME HEALTHCARE | Age: 57
End: 2022-11-16

## 2022-11-16 VITALS
SYSTOLIC BLOOD PRESSURE: 135 MMHG | BODY MASS INDEX: 29.53 KG/M2 | OXYGEN SATURATION: 99 % | DIASTOLIC BLOOD PRESSURE: 82 MMHG | WEIGHT: 218 LBS | HEART RATE: 78 BPM | HEIGHT: 72 IN | RESPIRATION RATE: 18 BRPM | TEMPERATURE: 99.3 F

## 2022-11-16 DIAGNOSIS — F25.9 SCHIZOAFFECTIVE DISORDER, UNSPECIFIED TYPE (HCC): ICD-10-CM

## 2022-11-16 DIAGNOSIS — Z13.228 SCREENING FOR ENDOCRINE, NUTRITIONAL, METABOLIC AND IMMUNITY DISORDER: ICD-10-CM

## 2022-11-16 DIAGNOSIS — M25.50 POLYARTHRALGIA: ICD-10-CM

## 2022-11-16 DIAGNOSIS — M1A.9XX1 TOPHACEOUS GOUT: ICD-10-CM

## 2022-11-16 DIAGNOSIS — N18.32 STAGE 3B CHRONIC KIDNEY DISEASE (HCC): Primary | ICD-10-CM

## 2022-11-16 DIAGNOSIS — Z13.29 SCREENING FOR ENDOCRINE, NUTRITIONAL, METABOLIC AND IMMUNITY DISORDER: ICD-10-CM

## 2022-11-16 DIAGNOSIS — Z13.0 SCREENING FOR ENDOCRINE, NUTRITIONAL, METABOLIC AND IMMUNITY DISORDER: ICD-10-CM

## 2022-11-16 DIAGNOSIS — Z13.21 SCREENING FOR ENDOCRINE, NUTRITIONAL, METABOLIC AND IMMUNITY DISORDER: ICD-10-CM

## 2022-11-16 NOTE — PROGRESS NOTES
[facility]       NAME: Flo Conde is a 59 y o  male  : 1965    MRN: [id number]  DATE: 2022  TIME: 2:03 PM    Assessment and Plan   Diagnoses and all orders for this visit:    Stage 3b chronic kidney disease ([facility] )  -     Comprehensive metabolic panel  -     Ambulatory Referral to Nephrology; Future    Screening for endocrine, nutritional, metabolic and immunity disorder  -     TSH, 3rd generation with Free T4 reflex; Future  -     Comprehensive metabolic panel  -     Lipid Panel with Direct LDL reflex; Future  -     CBC and differential    Polyarthralgia    Schizoaffective disorder, unspecified type ([facility] )    Tophaceous gout        Patient will make follow-up appointment with rheumatology to discuss medication options  Consult placed for Nephrology due to chronic kidney disease  Patient has lab work pending which he was supposed to have completed back in January labs reordered and will call patient with results and schedule follow-up appointment after reviewing labs  Patient however is due for annual visit and should command and 1 month  Instructed to call with any questions or concerns    Chief Complaint     Chief Complaint   Patient presents with   • Follow-up         History of Present Illness       HPI   78-year-old male here today for routine follow-up visit  Patient had lab work pending from January which she had not had completed  Patient was evaluated by Rheumatology for arthritis and gout  Was started on methotrexate and did recommend infusion therapy patient is resistant to the infusion therapy however is supposed to follow-up per Rheumatology in November and discuss his options  No other complaints at this time  Patient states has stopped / abstained from alcohol for past several months  Review of Systems   Review of Systems   Constitutional: Negative  Respiratory: Negative  Cardiovascular: Negative  Gastrointestinal: Negative  Musculoskeletal: Positive for arthralgias  Neurological: Negative  Psychiatric/Behavioral: Negative for suicidal ideas  All other systems reviewed and are negative  Current Medications       Current Outpatient Medications:   •  clonazePAM (KlonoPIN) 0 5 mg tablet, take 1 to 2 tablets by mouth daily if needed for anxiety, Disp: , Rfl:   •  folic acid (FOLVITE) 1 mg tablet, Take 1 tablet (1 mg total) by mouth daily, Disp: 30 tablet, Rfl: 5  •  methotrexate 2 5 mg tablet, 4 tablet po weekly (take all 4 tablets on the same day every week), Disp: 20 tablet, Rfl: 5  •  QUEtiapine (SEROquel) 300 mg tablet, Take 600 mg by mouth daily at bedtime 2 tabs at HS , Disp: , Rfl:   •  traZODone (DESYREL) 150 mg tablet, take 1/2 to 1 tablet by mouth at bedtime if needed for insomnia, Disp: , Rfl:   •  Diclofenac Sodium (VOLTAREN) 1 %, Apply 2 g topically 4 (four) times a day (Patient not taking: Reported on 11/16/2022), Disp: 100 g, Rfl: 6  •  traZODone (DESYREL) 100 mg tablet, Take 200 mg by mouth daily at bedtime (Patient not taking: Reported on 1/31/2022), Disp: , Rfl:     Current Allergies     Allergies as of 11/16/2022   • (No Known Allergies)            The following portions of the patient's history were reviewed and updated as appropriate: allergies, current medications, past family history, past medical history, past social history, past surgical history and problem list      Past Medical History:   Diagnosis Date   • Depression        History reviewed  No pertinent surgical history  History reviewed  No pertinent family history  Medications have been verified  Objective   /82 (BP Location: Left arm, Patient Position: Sitting, Cuff Size: Adult)   Pulse 78   Temp 99 3 °F (37 4 °C) (Temporal)   Resp 18   Ht 6' (1 829 m)   Wt 98 9 kg (218 lb)   SpO2 99%   BMI 29 57 kg/m²        Physical Exam     Physical Exam  Vitals and nursing note reviewed     Constitutional:       General: He is not in acute distress  Appearance: He is not ill-appearing, toxic-appearing or diaphoretic  HENT:      Head: Normocephalic  Cardiovascular:      Rate and Rhythm: Normal rate and regular rhythm  Pulmonary:      Effort: Pulmonary effort is normal       Breath sounds: Normal breath sounds  Abdominal:      General: Bowel sounds are normal       Palpations: Abdomen is soft  Tenderness: There is no abdominal tenderness  Musculoskeletal:      Right lower leg: No edema  Left lower leg: No edema  Neurological:      Mental Status: He is alert and oriented to person, place, and time     Psychiatric:         Mood and Affect: Mood normal

## 2022-11-18 ENCOUNTER — APPOINTMENT (OUTPATIENT)
Dept: LAB | Facility: HOSPITAL | Age: 57
End: 2022-11-18
Attending: INTERNAL MEDICINE

## 2022-11-18 DIAGNOSIS — Z13.0 SCREENING FOR ENDOCRINE, NUTRITIONAL, METABOLIC AND IMMUNITY DISORDER: ICD-10-CM

## 2022-11-18 DIAGNOSIS — Z13.21 SCREENING FOR ENDOCRINE, NUTRITIONAL, METABOLIC AND IMMUNITY DISORDER: ICD-10-CM

## 2022-11-18 DIAGNOSIS — M1A.9XX1 TOPHACEOUS GOUT: ICD-10-CM

## 2022-11-18 DIAGNOSIS — Z13.29 SCREENING FOR ENDOCRINE, NUTRITIONAL, METABOLIC AND IMMUNITY DISORDER: ICD-10-CM

## 2022-11-18 DIAGNOSIS — Z13.228 SCREENING FOR ENDOCRINE, NUTRITIONAL, METABOLIC AND IMMUNITY DISORDER: ICD-10-CM

## 2022-11-18 LAB
ALBUMIN SERPL BCP-MCNC: 4.1 G/DL (ref 3.5–5)
ALP SERPL-CCNC: 92 U/L (ref 46–116)
ALT SERPL W P-5'-P-CCNC: 38 U/L (ref 12–78)
ANION GAP SERPL CALCULATED.3IONS-SCNC: 6 MMOL/L (ref 4–13)
AST SERPL W P-5'-P-CCNC: 18 U/L (ref 5–45)
BASOPHILS # BLD AUTO: 0.05 THOUSANDS/ÂΜL (ref 0–0.1)
BASOPHILS NFR BLD AUTO: 1 % (ref 0–1)
BILIRUB SERPL-MCNC: 0.62 MG/DL (ref 0.2–1)
BUN SERPL-MCNC: 14 MG/DL (ref 5–25)
CALCIUM SERPL-MCNC: 10.1 MG/DL (ref 8.3–10.1)
CHLORIDE SERPL-SCNC: 106 MMOL/L (ref 96–108)
CHOLEST SERPL-MCNC: 157 MG/DL
CO2 SERPL-SCNC: 24 MMOL/L (ref 21–32)
CREAT SERPL-MCNC: 1.6 MG/DL (ref 0.6–1.3)
EOSINOPHIL # BLD AUTO: 0.07 THOUSAND/ÂΜL (ref 0–0.61)
EOSINOPHIL NFR BLD AUTO: 1 % (ref 0–6)
ERYTHROCYTE [DISTWIDTH] IN BLOOD BY AUTOMATED COUNT: 15 % (ref 11.6–15.1)
GFR SERPL CREATININE-BSD FRML MDRD: 46 ML/MIN/1.73SQ M
GLUCOSE P FAST SERPL-MCNC: 91 MG/DL (ref 65–99)
HCT VFR BLD AUTO: 45.9 % (ref 36.5–49.3)
HDLC SERPL-MCNC: 27 MG/DL
HGB BLD-MCNC: 15.3 G/DL (ref 12–17)
IMM GRANULOCYTES # BLD AUTO: 0.02 THOUSAND/UL (ref 0–0.2)
IMM GRANULOCYTES NFR BLD AUTO: 0 % (ref 0–2)
LDLC SERPL CALC-MCNC: 105 MG/DL (ref 0–100)
LYMPHOCYTES # BLD AUTO: 1.6 THOUSANDS/ÂΜL (ref 0.6–4.47)
LYMPHOCYTES NFR BLD AUTO: 21 % (ref 14–44)
MCH RBC QN AUTO: 29.1 PG (ref 26.8–34.3)
MCHC RBC AUTO-ENTMCNC: 33.3 G/DL (ref 31.4–37.4)
MCV RBC AUTO: 87 FL (ref 82–98)
MONOCYTES # BLD AUTO: 0.4 THOUSAND/ÂΜL (ref 0.17–1.22)
MONOCYTES NFR BLD AUTO: 5 % (ref 4–12)
NEUTROPHILS # BLD AUTO: 5.42 THOUSANDS/ÂΜL (ref 1.85–7.62)
NEUTS SEG NFR BLD AUTO: 72 % (ref 43–75)
NRBC BLD AUTO-RTO: 0 /100 WBCS
PLATELET # BLD AUTO: 315 THOUSANDS/UL (ref 149–390)
PMV BLD AUTO: 8.4 FL (ref 8.9–12.7)
POTASSIUM SERPL-SCNC: 3.8 MMOL/L (ref 3.5–5.3)
PROT SERPL-MCNC: 8.4 G/DL (ref 6.4–8.4)
RBC # BLD AUTO: 5.26 MILLION/UL (ref 3.88–5.62)
SODIUM SERPL-SCNC: 136 MMOL/L (ref 135–147)
T4 FREE SERPL-MCNC: 0.89 NG/DL (ref 0.76–1.46)
TRIGL SERPL-MCNC: 124 MG/DL
TSH SERPL DL<=0.05 MIU/L-ACNC: 0.41 UIU/ML (ref 0.45–4.5)
URATE SERPL-MCNC: 9.2 MG/DL (ref 3.5–8.5)
WBC # BLD AUTO: 7.56 THOUSAND/UL (ref 4.31–10.16)

## 2022-12-01 ENCOUNTER — TELEPHONE (OUTPATIENT)
Dept: RHEUMATOLOGY | Facility: CLINIC | Age: 57
End: 2022-12-01

## 2022-12-01 DIAGNOSIS — M1A.9XX1 TOPHACEOUS GOUT: Primary | ICD-10-CM

## 2022-12-01 DIAGNOSIS — N18.9 CHRONIC KIDNEY DISEASE, UNSPECIFIED CKD STAGE: ICD-10-CM

## 2022-12-01 RX ORDER — FEBUXOSTAT 40 MG/1
40 TABLET, FILM COATED ORAL DAILY
Qty: 30 TABLET | Refills: 6 | Status: SHIPPED | OUTPATIENT
Start: 2022-12-01

## 2022-12-01 RX ORDER — COLCHICINE 0.6 MG/1
0.6 TABLET ORAL DAILY
Qty: 30 TABLET | Refills: 6 | Status: SHIPPED | OUTPATIENT
Start: 2022-12-01

## 2022-12-01 NOTE — TELEPHONE ENCOUNTER
Please update patient that it is taking some time to work with his insurance company to get the infusions approved  In the meantime, I want to start him on colchicine 0 6mg daily and febuxostat 40mg daily to help with his gout  He can continue the weekly methotrexate and daily folic acid  Once we get the infusions approved, we'll have to hold the febuxostat  Please also get him scheduled for a follow-up appt with me ASAP      -HM

## 2022-12-14 NOTE — TELEPHONE ENCOUNTER
Caller: Tamiko    Doctor: Tamara Pena    Reason for call: Calling to get fax number for sending referral information for the infusion therapy medication       Call back#: n/a

## 2022-12-21 ENCOUNTER — HOSPITAL ENCOUNTER (OUTPATIENT)
Dept: INFUSION CENTER | Facility: HOSPITAL | Age: 57
Discharge: HOME | End: 2022-12-21
Attending: INTERNAL MEDICINE

## 2023-01-12 DIAGNOSIS — Z79.899 HIGH RISK MEDICATION USE: ICD-10-CM

## 2023-01-12 RX ORDER — FOLIC ACID 1 MG/1
TABLET ORAL
Qty: 30 TABLET | Refills: 5 | Status: SHIPPED | OUTPATIENT
Start: 2023-01-12 | End: 2023-01-18

## 2023-01-17 DIAGNOSIS — Z79.899 HIGH RISK MEDICATION USE: ICD-10-CM

## 2023-01-18 RX ORDER — FOLIC ACID 1 MG/1
TABLET ORAL
Qty: 30 TABLET | Refills: 5 | Status: SHIPPED | OUTPATIENT
Start: 2023-01-18

## 2023-01-22 ENCOUNTER — TELEPHONE (OUTPATIENT)
Dept: OTHER | Facility: OTHER | Age: 58
End: 2023-01-22

## 2023-01-22 DIAGNOSIS — N18.9 CHRONIC KIDNEY DISEASE, UNSPECIFIED CKD STAGE: ICD-10-CM

## 2023-01-22 DIAGNOSIS — M1A.9XX1 TOPHACEOUS GOUT: ICD-10-CM

## 2023-01-22 NOTE — TELEPHONE ENCOUNTER
Patient called stating his insurance is denying his treatments and was wondering if he could be put on a pill for his gout  Please call patient back on Monday to discuss

## 2023-01-23 RX ORDER — COLCHICINE 0.6 MG/1
0.6 TABLET ORAL DAILY
Qty: 30 TABLET | Refills: 6 | Status: SHIPPED | OUTPATIENT
Start: 2023-01-23

## 2023-01-23 RX ORDER — FEBUXOSTAT 40 MG/1
40 TABLET, FILM COATED ORAL DAILY
Qty: 30 TABLET | Refills: 6 | Status: SHIPPED | OUTPATIENT
Start: 2023-01-23

## 2023-01-24 NOTE — TELEPHONE ENCOUNTER
Please let him know that yes, we have been trying to reach out to him to get him started on the medication febuxostat 40 mg daily  Please work on prior authorization for this medication if necessary; allopurinol would be contraindicated in his chronic kidney disease  I have already sent febuxostat to his pharmacy  He also should be taking colchicine daily

## 2023-02-27 ENCOUNTER — HOSPITAL ENCOUNTER (OUTPATIENT)
Dept: INFUSION CENTER | Facility: HOSPITAL | Age: 58
Discharge: HOME | End: 2023-02-27
Attending: INTERNAL MEDICINE

## 2023-03-06 ENCOUNTER — HOSPITAL ENCOUNTER (OUTPATIENT)
Dept: INFUSION CENTER | Facility: HOSPITAL | Age: 58
Discharge: HOME | End: 2023-03-06
Attending: INTERNAL MEDICINE

## 2023-07-13 DIAGNOSIS — Z79.899 HIGH RISK MEDICATION USE: ICD-10-CM

## 2023-07-15 RX ORDER — FOLIC ACID 1 MG/1
TABLET ORAL
Qty: 30 TABLET | Refills: 5 | Status: SHIPPED | OUTPATIENT
Start: 2023-07-15

## 2023-07-25 DIAGNOSIS — M1A.9XX1 TOPHACEOUS GOUT: ICD-10-CM

## 2023-07-26 ENCOUNTER — TELEPHONE (OUTPATIENT)
Dept: RHEUMATOLOGY | Facility: CLINIC | Age: 58
End: 2023-07-26

## 2023-07-26 DIAGNOSIS — M1A.9XX1 TOPHACEOUS GOUT: Primary | ICD-10-CM

## 2023-07-26 RX ORDER — COLCHICINE 0.6 MG/1
0.6 TABLET ORAL DAILY
Qty: 30 TABLET | Refills: 6 | Status: SHIPPED | OUTPATIENT
Start: 2023-07-26

## 2023-07-26 NOTE — TELEPHONE ENCOUNTER
Patient was called and told that his prescription was called in to his pharmacy, and he was asked to call and verify that he is taking both his medications for gout.

## 2023-07-26 NOTE — TELEPHONE ENCOUNTER
Caller: Patient    Doctor: Carlos Wilder    Reason for call: Returning call.  Patient stated he is taking Folic Acid, Colchicine and Methotrexate    Call back#: [phone]

## 2023-08-14 ENCOUNTER — VBI (OUTPATIENT)
Dept: ADMINISTRATIVE | Facility: OTHER | Age: 58
End: 2023-08-14

## 2024-09-13 ENCOUNTER — TELEPHONE (OUTPATIENT)
Dept: FAMILY MEDICINE CLINIC | Facility: CLINIC | Age: 59
End: 2024-09-13

## 2024-09-13 NOTE — TELEPHONE ENCOUNTER
Spoke with patient about scheduling annual as patient not seen in almost 2 years. Patient states he will speak with his sister as she is his transportation